# Patient Record
Sex: MALE | Race: WHITE | NOT HISPANIC OR LATINO | Employment: FULL TIME | URBAN - METROPOLITAN AREA
[De-identification: names, ages, dates, MRNs, and addresses within clinical notes are randomized per-mention and may not be internally consistent; named-entity substitution may affect disease eponyms.]

---

## 2021-07-01 NOTE — H&P (VIEW-ONLY)
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 usual   His regular job is not vocally demanding  The following portions of the patient's history were reviewed and updated as appropriate: allergies, current medications, past family history, past medical history, past social history, past surgical history and problem list     Review of Systems      Objective: There were no vitals taken for this visit  Physical Exam  Constitutional:       Appearance: He is well-developed  HENT:      Head: Normocephalic and atraumatic  Right Ear: Tympanic membrane, ear canal and external ear normal  No drainage  No middle ear effusion  Left Ear: Tympanic membrane, ear canal and external ear normal  No drainage  No middle ear effusion  Nose: Nose normal       Mouth/Throat:      Pharynx: Uvula midline  No oropharyngeal exudate  Tonsils: 2+ on the right  2+ on the left  Neck:      Thyroid: No thyroid mass or thyromegaly  Trachea: Trachea normal  No tracheal deviation  Cardiovascular:      Heart sounds: Normal heart sounds, S1 normal and S2 normal    Pulmonary:      Breath sounds: Normal breath sounds and air entry  Abdominal:      General: Bowel sounds are normal       Palpations: Abdomen is soft  Lymphadenopathy:      Cervical: No cervical adenopathy  Neurological:      Mental Status: He is alert           Procedure: Strobovideolaryngoscopy (13734)  Dynamic Voice Analysis (88839)    Pre-Operative Diagnosis:  1  Dysphonia    Post-Operative Diagnosis:    1  Dysphonia   Surgeon: Courtney Aguilar MD  Anesthesia: lidocaine 4% + oxymetazoline     Stroboscope: EndoDigi  Flexible nasolaryngocsope and Rigid 70-degree laryngoscope  Procedure Details: The procedure was performed in the endoscopy special procedures room  A high resolution video laryngoscope was inserted transnasally / transorally and monitored on the video system for magnification and documentation  Stroboscopic light at several frequencies and intensities was used  Videostrobolaryngoscopic findings are deferred to the following report because the separate procedure with rigid endoscopy produces much higher resolution and allows diagnosis of lesions not visible during flexible laryngoscopy and dynamic voice analysis  Voice:      Supraglottic Hyperfunction: none  Arytenoid Joint Movement: nl  Dysdiadochokinesis: Absent  Arytenoids: nl  Posterior Cobblestoning: none  Right Vocal Fold:  Abduction:    normal  Adduction:    normal  Longitudinal Tension:   normal  Left Vocal Fold:  Abduction:    normal  Adduction:    normal  Longitudinal Tension:   normal    Videostrobolaryngoscopy with Magnification  Amplitude Symmetry:   Symmetric  Phase Symmetry:    Symmetric  Periodicity:    Regular  Glottic Closure:    complete  Vocal Process Height: normal  Post-Cricoid Edema: mild  Post-Cricoid Pachydermia: none  Amplitude of Vocal Folds:  Right: normal  Left: normal  Wave Form of Vocal Folds:  Right: normal  Left: normal  Vibratory Function of Vocal Folds:  Right: normal  Left: normal  Vocal Fold Color:  Right: normal  Left: increased  Masses/Vibratory Margin Irregularities: small cyst left anterior free edge, small reactive nodule right anterior free edge  Other Lesions: none    The procedure was concluded without complication and the laryngoscope was removed

## 2021-07-22 ENCOUNTER — ANESTHESIA EVENT (OUTPATIENT)
Dept: PERIOP | Facility: HOSPITAL | Age: 66
End: 2021-07-22
Payer: COMMERCIAL

## 2021-07-22 RX ORDER — VARENICLINE TARTRATE 1 MG/1
1 TABLET, FILM COATED ORAL 2 TIMES DAILY
COMMUNITY

## 2021-07-22 NOTE — PRE-PROCEDURE INSTRUCTIONS
Pre-Surgery Instructions:   Medication Instructions    varenicline (CHANTIX) 1 mg tablet Instructed patient to continue taking as prescribed up to and including DOS with sips of water  Med list reviewed as above  Also instructed pt not to start any new vitamins/supplements preoperatively and to avoid NSAID's  3 days prior to surgery  Tylenol is acceptable if needed  Pt will shower with regular soap in the am of sx  Reviewed [facility]'s current covid policy and pt understands that it may change at any time  All information within "My Surgical Experience" pamphlet reviewed and patient verbalizes understanding and compliance  All questions answered

## 2021-07-30 ENCOUNTER — HOSPITAL ENCOUNTER (OUTPATIENT)
Facility: HOSPITAL | Age: 66
Setting detail: OUTPATIENT SURGERY
Discharge: HOME | End: 2021-07-30
Attending: OTOLARYNGOLOGY | Admitting: OTOLARYNGOLOGY
Payer: COMMERCIAL

## 2021-07-30 ENCOUNTER — ANESTHESIA (OUTPATIENT)
Dept: PERIOP | Facility: HOSPITAL | Age: 66
End: 2021-07-30
Payer: COMMERCIAL

## 2021-07-30 VITALS
SYSTOLIC BLOOD PRESSURE: 145 MMHG | DIASTOLIC BLOOD PRESSURE: 89 MMHG | BODY MASS INDEX: 32.58 KG/M2 | HEART RATE: 83 BPM | HEIGHT: 68 IN | WEIGHT: 215 LBS | OXYGEN SATURATION: 97 % | RESPIRATION RATE: 20 BRPM | TEMPERATURE: 97.7 F

## 2021-07-30 DIAGNOSIS — J38.3 LESION OF VOCAL FOLD: ICD-10-CM

## 2021-07-30 PROBLEM — M19.90 ARTHRITIS: Status: ACTIVE | Noted: 2021-07-30

## 2021-07-30 PROBLEM — F17.200 SMOKING: Status: ACTIVE | Noted: 2021-07-30

## 2021-07-30 LAB
ANION GAP SERPL CALCULATED.3IONS-SCNC: 8 MMOL/L (ref 4–13)
BUN SERPL-MCNC: 20 MG/DL (ref 5–25)
CALCIUM SERPL-MCNC: 8.8 MG/DL (ref 8.3–10.1)
CHLORIDE SERPL-SCNC: 106 MMOL/L (ref 100–108)
CO2 SERPL-SCNC: 26 MMOL/L (ref 21–32)
CREAT SERPL-MCNC: 1.01 MG/DL (ref 0.6–1.3)
GFR SERPL CREATININE-BSD FRML MDRD: 77 ML/MIN/1.73SQ M
GLUCOSE P FAST SERPL-MCNC: 105 MG/DL (ref 65–99)
GLUCOSE SERPL-MCNC: 105 MG/DL (ref 65–140)
POTASSIUM SERPL-SCNC: 4.3 MMOL/L (ref 3.5–5.3)
SODIUM SERPL-SCNC: 140 MMOL/L (ref 136–145)

## 2021-07-30 PROCEDURE — 80048 BASIC METABOLIC PNL TOTAL CA: CPT | Performed by: ANESTHESIOLOGY

## 2021-07-30 PROCEDURE — 88305 TISSUE EXAM BY PATHOLOGIST: CPT | Performed by: PATHOLOGY

## 2021-07-30 PROCEDURE — 31541 LARYNSCOP W/TUMR EXC + SCOPE: CPT | Performed by: OTOLARYNGOLOGY

## 2021-07-30 RX ORDER — ONDANSETRON 2 MG/ML
4 INJECTION INTRAMUSCULAR; INTRAVENOUS ONCE AS NEEDED
Status: DISCONTINUED | OUTPATIENT
Start: 2021-07-30 | End: 2021-07-30 | Stop reason: HOSPADM

## 2021-07-30 RX ORDER — LIDOCAINE HYDROCHLORIDE 10 MG/ML
INJECTION, SOLUTION EPIDURAL; INFILTRATION; INTRACAUDAL; PERINEURAL AS NEEDED
Status: DISCONTINUED | OUTPATIENT
Start: 2021-07-30 | End: 2021-07-30

## 2021-07-30 RX ORDER — ONDANSETRON 2 MG/ML
INJECTION INTRAMUSCULAR; INTRAVENOUS AS NEEDED
Status: DISCONTINUED | OUTPATIENT
Start: 2021-07-30 | End: 2021-07-30

## 2021-07-30 RX ORDER — SODIUM CHLORIDE, SODIUM LACTATE, POTASSIUM CHLORIDE, CALCIUM CHLORIDE 600; 310; 30; 20 MG/100ML; MG/100ML; MG/100ML; MG/100ML
INJECTION, SOLUTION INTRAVENOUS CONTINUOUS PRN
Status: DISCONTINUED | OUTPATIENT
Start: 2021-07-30 | End: 2021-07-30

## 2021-07-30 RX ORDER — EPINEPHRINE NASAL SOLUTION 1 MG/ML
SOLUTION NASAL AS NEEDED
Status: DISCONTINUED | OUTPATIENT
Start: 2021-07-30 | End: 2021-07-30 | Stop reason: HOSPADM

## 2021-07-30 RX ORDER — FENTANYL CITRATE 50 UG/ML
INJECTION, SOLUTION INTRAMUSCULAR; INTRAVENOUS AS NEEDED
Status: DISCONTINUED | OUTPATIENT
Start: 2021-07-30 | End: 2021-07-30

## 2021-07-30 RX ORDER — GINSENG 100 MG
CAPSULE ORAL AS NEEDED
Status: DISCONTINUED | OUTPATIENT
Start: 2021-07-30 | End: 2021-07-30 | Stop reason: HOSPADM

## 2021-07-30 RX ORDER — PROPOFOL 10 MG/ML
INJECTION, EMULSION INTRAVENOUS CONTINUOUS PRN
Status: DISCONTINUED | OUTPATIENT
Start: 2021-07-30 | End: 2021-07-30

## 2021-07-30 RX ORDER — MIDAZOLAM HYDROCHLORIDE 2 MG/2ML
INJECTION, SOLUTION INTRAMUSCULAR; INTRAVENOUS AS NEEDED
Status: DISCONTINUED | OUTPATIENT
Start: 2021-07-30 | End: 2021-07-30

## 2021-07-30 RX ORDER — SUCCINYLCHOLINE/SOD CL,ISO/PF 100 MG/5ML
SYRINGE (ML) INTRAVENOUS AS NEEDED
Status: DISCONTINUED | OUTPATIENT
Start: 2021-07-30 | End: 2021-07-30

## 2021-07-30 RX ORDER — ROCURONIUM BROMIDE 10 MG/ML
INJECTION, SOLUTION INTRAVENOUS AS NEEDED
Status: DISCONTINUED | OUTPATIENT
Start: 2021-07-30 | End: 2021-07-30

## 2021-07-30 RX ORDER — PROPOFOL 10 MG/ML
INJECTION, EMULSION INTRAVENOUS AS NEEDED
Status: DISCONTINUED | OUTPATIENT
Start: 2021-07-30 | End: 2021-07-30

## 2021-07-30 RX ORDER — SODIUM CHLORIDE 9 MG/ML
20 INJECTION, SOLUTION INTRAVENOUS CONTINUOUS
Status: CANCELLED | OUTPATIENT
Start: 2021-07-30

## 2021-07-30 RX ORDER — ACETAMINOPHEN 325 MG/1
650 TABLET ORAL EVERY 6 HOURS PRN
Status: DISCONTINUED | OUTPATIENT
Start: 2021-07-30 | End: 2021-07-30 | Stop reason: HOSPADM

## 2021-07-30 RX ORDER — MAGNESIUM HYDROXIDE 1200 MG/15ML
LIQUID ORAL AS NEEDED
Status: DISCONTINUED | OUTPATIENT
Start: 2021-07-30 | End: 2021-07-30 | Stop reason: HOSPADM

## 2021-07-30 RX ORDER — FENTANYL CITRATE/PF 50 MCG/ML
25 SYRINGE (ML) INJECTION
Status: DISCONTINUED | OUTPATIENT
Start: 2021-07-30 | End: 2021-07-30 | Stop reason: HOSPADM

## 2021-07-30 RX ORDER — DEXAMETHASONE SODIUM PHOSPHATE 4 MG/ML
INJECTION, SOLUTION INTRA-ARTICULAR; INTRALESIONAL; INTRAMUSCULAR; INTRAVENOUS; SOFT TISSUE AS NEEDED
Status: DISCONTINUED | OUTPATIENT
Start: 2021-07-30 | End: 2021-07-30

## 2021-07-30 RX ORDER — EPINEPHRINE 1 MG/ML
INJECTION, SOLUTION, CONCENTRATE INTRAVENOUS AS NEEDED
Status: DISCONTINUED | OUTPATIENT
Start: 2021-07-30 | End: 2021-07-30 | Stop reason: HOSPADM

## 2021-07-30 RX ADMIN — DEXAMETHASONE SODIUM PHOSPHATE 2 MG: 4 INJECTION, SOLUTION INTRAMUSCULAR; INTRAVENOUS at 12:15

## 2021-07-30 RX ADMIN — SUGAMMADEX 200 MG: 100 INJECTION, SOLUTION INTRAVENOUS at 12:37

## 2021-07-30 RX ADMIN — SODIUM CHLORIDE, SODIUM LACTATE, POTASSIUM CHLORIDE, AND CALCIUM CHLORIDE: .6; .31; .03; .02 INJECTION, SOLUTION INTRAVENOUS at 11:52

## 2021-07-30 RX ADMIN — ROCURONIUM BROMIDE 30 MG: 10 SOLUTION INTRAVENOUS at 12:10

## 2021-07-30 RX ADMIN — PROPOFOL 200 MG: 10 INJECTION, EMULSION INTRAVENOUS at 12:01

## 2021-07-30 RX ADMIN — ONDANSETRON 4 MG: 2 INJECTION INTRAMUSCULAR; INTRAVENOUS at 12:01

## 2021-07-30 RX ADMIN — REMIFENTANIL HYDROCHLORIDE 0.15 MCG/KG/MIN: 1 INJECTION, POWDER, LYOPHILIZED, FOR SOLUTION INTRAVENOUS at 12:05

## 2021-07-30 RX ADMIN — LIDOCAINE HYDROCHLORIDE 50 MG: 10 INJECTION, SOLUTION EPIDURAL; INFILTRATION; INTRACAUDAL; PERINEURAL at 12:01

## 2021-07-30 RX ADMIN — FENTANYL CITRATE 50 MCG: 50 INJECTION INTRAMUSCULAR; INTRAVENOUS at 12:01

## 2021-07-30 RX ADMIN — MIDAZOLAM 2 MG: 1 INJECTION INTRAMUSCULAR; INTRAVENOUS at 11:56

## 2021-07-30 RX ADMIN — Medication 100 MG: at 12:01

## 2021-07-30 RX ADMIN — DEXAMETHASONE SODIUM PHOSPHATE 8 MG: 4 INJECTION, SOLUTION INTRAMUSCULAR; INTRAVENOUS at 12:01

## 2021-07-30 RX ADMIN — PROPOFOL 140 MCG/KG/MIN: 10 INJECTION, EMULSION INTRAVENOUS at 12:05

## 2021-07-30 RX ADMIN — ACETAMINOPHEN 650 MG: 325 TABLET, FILM COATED ORAL at 13:49

## 2021-07-30 RX ADMIN — SODIUM CHLORIDE, SODIUM LACTATE, POTASSIUM CHLORIDE, AND CALCIUM CHLORIDE: .6; .31; .03; .02 INJECTION, SOLUTION INTRAVENOUS at 12:55

## 2021-07-30 NOTE — INTERVAL H&P NOTE
H&P reviewed  After examining the patient I find no changes in the patients condition since the H&P had been written      Vitals:    07/30/21 1051   BP: 133/86   Pulse: 75   Resp: 18   Temp: (!) 97 2 °F (36 2 °C)   SpO2: 96%

## 2021-07-30 NOTE — OP NOTE
OPERATIVE REPORT  PATIENT NAME: Houston Mcdonald    :  1955  MRN: [id number]  Pt Location: AN OR ROOM 03    SURGERY DATE: 2021    Surgeon(s) and Role:     * Eyad Stewart MD - Primary    Preop Diagnosis:  Lesion of vocal fold [J38 3]    Post-Op Diagnosis Codes:     * Lesion of vocal fold [J38 3]    Procedure(s) (LRB):  MICROLARYNGOSCOPY AND EXCISION OF LEFT VOCAL FOLD LESION AND EXCISION OF RIGHT ARYTENOID LESION (Bilateral)    Specimen(s):  ID Type Source Tests Collected by Time Destination   1 : LEFT VOCAL FOLD LESION Tissue Vocal cord TISSUE EXAM Eyad Stewart MD 2021 1224    2 : RIGHT ARYTENOID LESION Tissue Vocal cord TISSUE EXAM Eyad Stewart MD 2021 1230        Estimated Blood Loss:   Minimal    Drains:  * No LDAs found *    Anesthesia Type:   General    Operative Indications:  Lesion of vocal fold [J38 3]  Dysphonia    Operative Findings:  1) small, hemorrhagic cyst on the free edge of the anterior left vocal fold  2) small, friable lesion on the surface of the right arytenoid, near the vocal process     Complications:   None    Procedure and Technique:  After induction of general endotracheal anesthesia with a # 5 MLT endotracheal tube, the patient was draped in the sterile fashion  A tooth guard was placed on the upper teeth  The Sataloff large male direct laryngoscope was inserted, and placed in the suspension apparatus  Tape was used for anterior laryngeal traction  The operating microscope was positioned to provide a magnified view of the larynx  The left vocal fold contained what appeared to be a small cyst just submucosal to the free edge, in the anterior 1/3 of the vocal fold  The area was injected with epinephrine 1:1,000  Micro scissors and micro graspers were used to dissect the cyst from the surrounding tissues, and excise it  Bleeding was controlled with adrenaline pledgets       During the laryngoscopy, a small, exophytic and friable lesion was noticed on the surface of the right arytenoid, superomedial to the vocal process  The area was injected with epinephrine 1:1,000  Micro scissors and micro graspers were used to excise the lesion  Bleeding was controlled with adrenaline pledgets  At the end of the procedure, the patient was released from suspension, and the laryngoscope, tape, and tooth guard were removed  The teeth and pharynx were examined for any injuries, and no injuries were seen  When the patient awoke from general anesthesia, he was extubated and transported to the recovery room in satisfactory condition       I was present for the entire procedure    Patient Disposition:  PACU     SIGNATURE: Fior Taveras MD  DATE: July 30, 2021  TIME: 12:40 PM

## 2021-07-30 NOTE — ANESTHESIA POSTPROCEDURE EVALUATION
Post-Op Assessment Note    CV Status:  Stable    Pain management: adequate     Mental Status:  Alert and awake   Hydration Status:  Euvolemic   PONV Controlled:  Controlled   Airway Patency:  Patent  Airway: intubated      Post Op Vitals Reviewed: Yes      Staff: CRNA, Anesthesiologist         No complications documented      BP (P) 145/73 (07/30/21 1258)    Temp (!) (P) 97 °F (36 1 °C) (07/30/21 1258)    Pulse (P) 89 (07/30/21 1258)   Resp (P) 16 (07/30/21 1258)    SpO2   98

## 2021-07-30 NOTE — ANESTHESIA PREPROCEDURE EVALUATION
Procedure:  MICROLARYNGOSCOPY AND EXCISION OF VOCAL FOLD LESION (Bilateral Throat)    Relevant Problems   MUSCULOSKELETAL   (+) Arthritis      PULMONARY   (+) Smoking      Last smoked 12 days ago  7/1/21 Videostrobolaryngoscopy with Magnification  Amplitude Symmetry:   Symmetric  Phase Symmetry:    Symmetric  Periodicity:    Regular  Glottic Closure:    complete  Vocal Process Height: normal  Post-Cricoid Edema: mild  Post-Cricoid Pachydermia: none  Amplitude of Vocal Folds:  Right: normal  Left: normal  Wave Form of Vocal Folds:  Right: normal  Left: normal  Vibratory Function of Vocal Folds:  Right: normal  Left: normal  Vocal Fold Color:  Right: normal  Left: increased  Masses/Vibratory Margin Irregularities: small cyst left anterior free edge, small reactive nodule right anterior free edge  Other Lesions: none     Physical Exam    Airway    Mallampati score: II  TM Distance: >3 FB  Neck ROM: full     Dental       Cardiovascular  Cardiovascular exam normal    Pulmonary  Pulmonary exam normal     Other Findings        Anesthesia Plan  ASA Score- 2     Anesthesia Type- general with ASA Monitors  Additional Monitors:   Airway Plan: ETT  Plan Factors-Exercise tolerance (METS): >4 METS  Chart reviewed  Patient summary reviewed  Patient is a current smoker  Patient did not smoke on day of surgery  Induction- intravenous  Postoperative Plan- Plan for postoperative opioid use  Planned trial extubation    Informed Consent- Anesthetic plan and risks discussed with patient  I personally reviewed this patient with the CRNA  Discussed and agreed on the Anesthesia Plan with the CRNA  Alexandra Wood

## (undated) DEVICE — NEEDLE 18 G X 1 1/2 SAFETY

## (undated) DEVICE — TELFA NON-ADHERENT ABSORBENT DRESSING: Brand: TELFA

## (undated) DEVICE — GLOVE SRG BIOGEL 7

## (undated) DEVICE — BETHLEHEM UNIVERSAL OUTPATIENT: Brand: CARDINAL HEALTH

## (undated) DEVICE — TUBING SUCTION 5MM X 12 FT